# Patient Record
Sex: MALE | Race: WHITE | Employment: UNEMPLOYED | URBAN - METROPOLITAN AREA
[De-identification: names, ages, dates, MRNs, and addresses within clinical notes are randomized per-mention and may not be internally consistent; named-entity substitution may affect disease eponyms.]

---

## 2020-05-27 ENCOUNTER — TRANSFERRED RECORDS (OUTPATIENT)
Dept: HEALTH INFORMATION MANAGEMENT | Facility: CLINIC | Age: 30
End: 2020-05-27

## 2020-06-25 ENCOUNTER — MEDICAL CORRESPONDENCE (OUTPATIENT)
Dept: HEALTH INFORMATION MANAGEMENT | Facility: CLINIC | Age: 30
End: 2020-06-25

## 2020-07-07 ENCOUNTER — TRANSCRIBE ORDERS (OUTPATIENT)
Dept: OTHER | Age: 30
End: 2020-07-07

## 2020-07-07 DIAGNOSIS — R51.9 HEAD PAIN: Primary | ICD-10-CM

## 2020-08-20 ENCOUNTER — VIRTUAL VISIT (OUTPATIENT)
Dept: NEUROLOGY | Facility: CLINIC | Age: 30
End: 2020-08-20
Payer: COMMERCIAL

## 2020-08-20 DIAGNOSIS — R51.9 UNILATERAL HEADACHE: Primary | ICD-10-CM

## 2020-08-20 PROCEDURE — 99443 ZZC PHYSICIAN TELEPHONE EVALUATION 21-30 MIN: CPT | Performed by: PSYCHIATRY & NEUROLOGY

## 2020-08-20 RX ORDER — METOPROLOL TARTRATE 25 MG/1
25 TABLET, FILM COATED ORAL DAILY
COMMUNITY

## 2020-08-20 RX ORDER — SERTRALINE HYDROCHLORIDE 100 MG/1
100 TABLET, FILM COATED ORAL DAILY
COMMUNITY

## 2020-08-20 RX ORDER — DULOXETIN HYDROCHLORIDE 20 MG/1
20 CAPSULE, DELAYED RELEASE ORAL 2 TIMES DAILY
COMMUNITY

## 2020-08-20 RX ORDER — BUSPIRONE HYDROCHLORIDE 10 MG/1
20 TABLET ORAL 2 TIMES DAILY
COMMUNITY

## 2020-08-20 RX ORDER — NAPROXEN 500 MG/1
TABLET ORAL PRN
COMMUNITY

## 2020-08-20 RX ORDER — PRAZOSIN HYDROCHLORIDE 1 MG/1
1 CAPSULE ORAL AT BEDTIME
COMMUNITY

## 2020-08-20 RX ORDER — LISINOPRIL 10 MG/1
10 TABLET ORAL DAILY
COMMUNITY

## 2020-08-20 RX ORDER — MIRTAZAPINE 30 MG/1
30 TABLET, FILM COATED ORAL AT BEDTIME
COMMUNITY

## 2020-08-20 NOTE — PROGRESS NOTES
"Visit Date:   2020         Iris Bolaños NP   [facility]   [address] [place] [address]      Patient:  Ulysses Cooper   MRN:  [id number]   :  1990      This is a telephone consultation being done via telephone because of COVID-19.      Dear Ms. :      I did a telephone consultation for your patient, Ulysses Cooper, on 2020.  He is a 30-year-old male being evaluated for persistent right-sided headache.  He currently resides in the [facility].  Tico Quick RN is also on the call.      Mr. Cooper has had a constant right-sided head pain for the last 8 months.  He indicates it is around the right temple and to the center of his head.  The pain is worse when he is lying on his left side.  It is increased with activity and strain.  He may awaken in the middle of the night with the headache.      He also reports other symptoms.  He indicates he sees \"clear floating things\" in his vision.  This is bilateral and not affecting just a single visual field.  He also reports a change in his color perception.  For example, skin looks green.  He feels like he is disconnected from his body.  He reports dizziness and feels that his coordination is off, but he indicates his gait is fine.  He has no focal weakness.  He has a continuous noise in his head that is a nonpulsatile sound.      He does have a history of migraine in childhood that resolved when he was in his teens.  The headache he is experiencing now is quite different from his migraines.  The migraines involved his entire head.  He was photophobic and had nausea and vomiting with these.      He had been on gabapentin 300 mg 3 times a day for a while, but he does not think that that helped the headaches, although he did tolerate the drug well.  He indicates amitriptyline was of no help.  He is not certain if Topamax helped, but it made him feel \"weird.\"  Imitrex was of no benefit.  " He is now using naproxen, which has not helped.  He is also on duloxetine.      PAST MEDICAL HISTORY:  Notable for migraine in his childhood and teens, hypertension, anxiety and PTSD.      CURRENT MEDICATIONS:   1.  BuSpar 20 mg twice a day.   2.  Duloxetine 20 mg twice a day.   3.  Lisinopril.   4.  Metoprolol.   5.  Mirtazapine.   6.  Naproxen.   7.  Prazosin.   8.  Sertraline 200 mg.      ALLERGIES:  HE HAS NO MEDICAL ALLERGIES.      FAMILY HISTORY:  Negative for migraine.  There is a positive family history for head and neck cancer.  Apparently, these individuals were smokers.  There is no family history of brain tumor that he is aware of.      SOCIAL HISTORY:  He currently resides at the [facility].  He has not smoked for 2 years.  He does not use alcohol.      PHYSICAL EXAMINATION:  Could not be performed, because this is a telephone visit.      IMPRESSION:     1.  Persistent right-sided unilateral headache.   2.  History of migraine.      The current headache does not suggest migraine.  There are some disconcerting features to the headache.  It increases with strain for example, and it also awakens him from sleep.  I believe a structural intracranial lesion needs to be ruled out.      PLAN:  I have recommended a head MRI with and without contrast and discussed this with Mr. Cooper, as well as Tico Quick RN.      If the head MRI scan proves negative, then I think it would be worthwhile to retry gabapentin, perhaps at a higher dose than the 300 mg 3 times a day.  Specifically, I would start him on 300 mg 3 times a day and then over the next month or so gradually increase the dose as tolerated up to 600 mg 3 times a day or even 900 mg 3 times a day before concluding that it was ineffective.  Potential side effects were discussed, including the potential for depression.      I would appreciate receiving a copy of the MRI scan and the report once completed.      I would be happy to see Mr. Cooper  personally if necessary.      ADDENDUM:  Total telephone visit time was 21 minutes.     ADDENDUM 10/14/20: Head MRI done and is unremarkable. Sent letter to ANTONIO Garber MD             D: 2020   T: 2020   MT: ARNULFO      Name:     SHAWN HEARD   MRN:      [id number]        Account:      [id number]   :      1990           Visit Date:   2020      Document: [id number]       cc: [facility]

## 2020-08-20 NOTE — PROGRESS NOTES
"Ulysses Cooper is a 30 year old male who is being evaluated via a billable telephone visit.      The patient has been notified of following:     \"This telephone visit will be conducted via a call between you and your physician/provider. We have found that certain health care needs can be provided without the need for a physical exam.  This service lets us provide the care you need with a short phone conversation.  If a prescription is necessary we can send it directly to your pharmacy.  If lab work is needed we can place an order for that and you can then stop by our lab to have the test done at a later time.    Telephone visits are billed at different rates depending on your insurance coverage. During this emergency period, for some insurers they may be billed the same as an in-person visit.  Please reach out to your insurance provider with any questions.    If during the course of the call the physician/provider feels a telephone visit is not appropriate, you will not be charged for this service.\"    Patient has given verbal consent for Telephone visit? yes    What phone number would you like to be contacted at? [phone]        Phone call duration: 21 minutes          "

## 2020-08-20 NOTE — LETTER
"2020       RE: Ulysses Cooper  [facility] Office  [address]  [facility] [address]     Dear Colleague,    Thank you for referring your patient, Ulysses Cooper, to the [facility]. Please see a copy of my visit note below.    Ulysses Cooper is a 30 year old male who is being evaluated via a billable telephone visit.      The patient has been notified of following:     \"This telephone visit will be conducted via a call between you and your physician/provider. We have found that certain health care needs can be provided without the need for a physical exam.  This service lets us provide the care you need with a short phone conversation.  If a prescription is necessary we can send it directly to your pharmacy.  If lab work is needed we can place an order for that and you can then stop by our lab to have the test done at a later time.    Telephone visits are billed at different rates depending on your insurance coverage. During this emergency period, for some insurers they may be billed the same as an in-person visit.  Please reach out to your insurance provider with any questions.    If during the course of the call the physician/provider feels a telephone visit is not appropriate, you will not be charged for this service.\"    Patient has given verbal consent for Telephone visit? yes    What phone number would you like to be contacted at? [phone]        Phone call duration: 21 minutes            Visit Date:   2020         Iris Bolaños NP   [address] [address]      Patient:  Ulysses Cooper   MRN:  [id number]   :  1990      This is a telephone consultation being done via telephone because of COVID-19.      Dear Ms. Bolaños:      I did a telephone consultation for your patient, Ulysses Cooper, on 2020.  He is a 30-year-old male being evaluated for " "persistent right-sided headache.  He currently resides in the [facility].  Tico Quick RN is also on the call.      Mr. Cooper has had a constant right-sided head pain for the last 8 months.  He indicates it is around the right temple and to the center of his head.  The pain is worse when he is lying on his left side.  It is increased with activity and strain.  He may awaken in the middle of the night with the headache.      He also reports other symptoms.  He indicates he sees \"clear floating things\" in his vision.  This is bilateral and not affecting just a single visual field.  He also reports a change in his color perception.  For example, skin looks green.  He feels like he is disconnected from his body.  He reports dizziness and feels that his coordination is off, but he indicates his gait is fine.  He has no focal weakness.  He has a continuous noise in his head that is a nonpulsatile sound.      He does have a history of migraine in childhood that resolved when he was in his teens.  The headache he is experiencing now is quite different from his migraines.  The migraines involved his entire head.  He was photophobic and had nausea and vomiting with these.      He had been on gabapentin 300 mg 3 times a day for a while, but he does not think that that helped the headaches, although he did tolerate the drug well.  He indicates amitriptyline was of no help.  He is not certain if Topamax helped, but it made him feel \"weird.\"  Imitrex was of no benefit.  He is now using naproxen, which has not helped.  He is also on duloxetine.      PAST MEDICAL HISTORY:  Notable for migraine in his childhood and teens, hypertension, anxiety and PTSD.      CURRENT MEDICATIONS:   1.  BuSpar 20 mg twice a day.   2.  Duloxetine 20 mg twice a day.   3.  Lisinopril.   4.  Metoprolol.   5.  Mirtazapine.   6.  Naproxen.   7.  Prazosin.   8.  Sertraline 200 mg.      ALLERGIES:  HE HAS NO MEDICAL ALLERGIES.      FAMILY " HISTORY:  Negative for migraine.  There is a positive family history for head and neck cancer.  Apparently, these individuals were smokers.  There is no family history of brain tumor that he is aware of.      SOCIAL HISTORY:  He currently resides at the [facility].  He has not smoked for 2 years.  He does not use alcohol.      PHYSICAL EXAMINATION:  Could not be performed, because this is a telephone visit.      IMPRESSION:     1.  Persistent right-sided unilateral headache.   2.  History of migraine.      The current headache does not suggest migraine.  There are some disconcerting features to the headache.  It increases with strain for example, and it also awakens him from sleep.  I believe a structural intracranial lesion needs to be ruled out.      PLAN:  I have recommended a head MRI with and without contrast and discussed this with Mr. Cooper, as well as Tico Quick RN.      If the head MRI scan proves negative, then I think it would be worthwhile to retry gabapentin, perhaps at a higher dose than the 300 mg 3 times a day.  Specifically, I would start him on 300 mg 3 times a day and then over the next month or so gradually increase the dose as tolerated up to 600 mg 3 times a day or even 900 mg 3 times a day before concluding that it was ineffective.  Potential side effects were discussed, including the potential for depression.      I would appreciate receiving a copy of the MRI scan and the report once completed.      I would be happy to see Mr. Cooper personally if necessary.      ADDENDUM:  Total telephone visit time was 21 minutes.         GEOVANNA MOSLEY MD             D: 2020   T: 2020   MT: ARNULFO      Name:     SHAWN COOPER   MRN:      [id number]        Account:      [id number]   :      1990           Visit Date:   2020      Document: [id number]       cc: [facility]        Again, thank you for allowing me to participate in the care of your patient.       Sincerely,    Carlos De Luna MD

## 2020-08-20 NOTE — LETTER
"    2020         RE: Ulysses Cooper  [facility] Office  [address]  [facility] [address]        Dear Colleague,    Thank you for referring your patient, Ulysses Cooper, to the [facility]. Please see a copy of my visit note below.    Ulysses Cooper is a 30 year old male who is being evaluated via a billable telephone visit.      The patient has been notified of following:     \"This telephone visit will be conducted via a call between you and your physician/provider. We have found that certain health care needs can be provided without the need for a physical exam.  This service lets us provide the care you need with a short phone conversation.  If a prescription is necessary we can send it directly to your pharmacy.  If lab work is needed we can place an order for that and you can then stop by our lab to have the test done at a later time.    Telephone visits are billed at different rates depending on your insurance coverage. During this emergency period, for some insurers they may be billed the same as an in-person visit.  Please reach out to your insurance provider with any questions.    If during the course of the call the physician/provider feels a telephone visit is not appropriate, you will not be charged for this service.\"    Patient has given verbal consent for Telephone visit? yes    What phone number would you like to be contacted at? [phone]        Phone call duration: 21 minutes            Visit Date:   2020         Iris Bolaños NP   [address] [address]      Patient:  Ulysses Cooper   MRN:  [id number]   :  1990      This is a telephone consultation being done via telephone because of COVID-19.      Dear Ms. Bolaños:      I did a telephone consultation for your patient, Ulysses Cooper, on 2020.  He is a 30-year-old male being evaluated for persistent right-sided headache.  He currently " "resides in the [facility].  Tico Quick RN is also on the call.      Mr. Cooper has had a constant right-sided head pain for the last 8 months.  He indicates it is around the right temple and to the center of his head.  The pain is worse when he is lying on his left side.  It is increased with activity and strain.  He may awaken in the middle of the night with the headache.      He also reports other symptoms.  He indicates he sees \"clear floating things\" in his vision.  This is bilateral and not affecting just a single visual field.  He also reports a change in his color perception.  For example, skin looks green.  He feels like he is disconnected from his body.  He reports dizziness and feels that his coordination is off, but he indicates his gait is fine.  He has no focal weakness.  He has a continuous noise in his head that is a nonpulsatile sound.      He does have a history of migraine in childhood that resolved when he was in his teens.  The headache he is experiencing now is quite different from his migraines.  The migraines involved his entire head.  He was photophobic and had nausea and vomiting with these.      He had been on gabapentin 300 mg 3 times a day for a while, but he does not think that that helped the headaches, although he did tolerate the drug well.  He indicates amitriptyline was of no help.  He is not certain if Topamax helped, but it made him feel \"weird.\"  Imitrex was of no benefit.  He is now using naproxen, which has not helped.  He is also on duloxetine.      PAST MEDICAL HISTORY:  Notable for migraine in his childhood and teens, hypertension, anxiety and PTSD.      CURRENT MEDICATIONS:   1.  BuSpar 20 mg twice a day.   2.  Duloxetine 20 mg twice a day.   3.  Lisinopril.   4.  Metoprolol.   5.  Mirtazapine.   6.  Naproxen.   7.  Prazosin.   8.  Sertraline 200 mg.      ALLERGIES:  HE HAS NO MEDICAL ALLERGIES.      FAMILY HISTORY:  Negative for migraine.  There is a " positive family history for head and neck cancer.  Apparently, these individuals were smokers.  There is no family history of brain tumor that he is aware of.      SOCIAL HISTORY:  He currently resides at the [facility].  He has not smoked for 2 years.  He does not use alcohol.      PHYSICAL EXAMINATION:  Could not be performed, because this is a telephone visit.      IMPRESSION:     1.  Persistent right-sided unilateral headache.   2.  History of migraine.      The current headache does not suggest migraine.  There are some disconcerting features to the headache.  It increases with strain for example, and it also awakens him from sleep.  I believe a structural intracranial lesion needs to be ruled out.      PLAN:  I have recommended a head MRI with and without contrast and discussed this with Mr. Cooper, as well as Tico Quick RN.      If the head MRI scan proves negative, then I think it would be worthwhile to retry gabapentin, perhaps at a higher dose than the 300 mg 3 times a day.  Specifically, I would start him on 300 mg 3 times a day and then over the next month or so gradually increase the dose as tolerated up to 600 mg 3 times a day or even 900 mg 3 times a day before concluding that it was ineffective.  Potential side effects were discussed, including the potential for depression.      I would appreciate receiving a copy of the MRI scan and the report once completed.      I would be happy to see Mr. Cooper personally if necessary.      ADDENDUM:  Total telephone visit time was 21 minutes.         CARLOS MOSLEY MD             D: 2020   T: 2020   MT: ARNULFO      Name:     SHAWN COOPER   MRN:      [id number]        Account:      [id number]   :      1990           Visit Date:   2020      Document: [id number]       cc: [facility]        Again, thank you for allowing me to participate in the care of your patient.        Sincerely,        Carlos Mosley,  MD

## 2020-09-09 ENCOUNTER — APPOINTMENT (OUTPATIENT)
Dept: CT IMAGING | Facility: CLINIC | Age: 30
End: 2020-09-09
Attending: EMERGENCY MEDICINE
Payer: COMMERCIAL

## 2020-09-09 ENCOUNTER — HOSPITAL ENCOUNTER (EMERGENCY)
Facility: CLINIC | Age: 30
Discharge: HOME | End: 2020-09-09
Attending: EMERGENCY MEDICINE | Admitting: EMERGENCY MEDICINE
Payer: COMMERCIAL

## 2020-09-09 VITALS
TEMPERATURE: 99 F | RESPIRATION RATE: 18 BRPM | SYSTOLIC BLOOD PRESSURE: 128 MMHG | HEART RATE: 103 BPM | OXYGEN SATURATION: 95 % | DIASTOLIC BLOOD PRESSURE: 80 MMHG

## 2020-09-09 DIAGNOSIS — R55 SYNCOPE AND COLLAPSE: ICD-10-CM

## 2020-09-09 DIAGNOSIS — R51.9 RIGHT-SIDED HEADACHE: ICD-10-CM

## 2020-09-09 DIAGNOSIS — R53.1 LEFT-SIDED WEAKNESS: ICD-10-CM

## 2020-09-09 LAB
ALBUMIN SERPL-MCNC: 4.3 G/DL (ref 3.4–5)
ALP SERPL-CCNC: 61 U/L (ref 40–150)
ALT SERPL W P-5'-P-CCNC: 37 U/L (ref 0–70)
ANION GAP SERPL CALCULATED.3IONS-SCNC: 7 MMOL/L (ref 3–14)
AST SERPL W P-5'-P-CCNC: 25 U/L (ref 0–45)
BASOPHILS # BLD AUTO: 0.1 10E9/L (ref 0–0.2)
BASOPHILS NFR BLD AUTO: 0.8 %
BILIRUB SERPL-MCNC: 0.3 MG/DL (ref 0.2–1.3)
BUN SERPL-MCNC: 20 MG/DL (ref 7–30)
CALCIUM SERPL-MCNC: 9.2 MG/DL (ref 8.5–10.1)
CHLORIDE SERPL-SCNC: 105 MMOL/L (ref 94–109)
CO2 SERPL-SCNC: 27 MMOL/L (ref 20–32)
CREAT SERPL-MCNC: 1.1 MG/DL (ref 0.66–1.25)
DIFFERENTIAL METHOD BLD: NORMAL
EOSINOPHIL NFR BLD AUTO: 1.2 %
ERYTHROCYTE [DISTWIDTH] IN BLOOD BY AUTOMATED COUNT: 11.8 % (ref 10–15)
GFR SERPL CREATININE-BSD FRML MDRD: 89 ML/MIN/{1.73_M2}
GLUCOSE SERPL-MCNC: 95 MG/DL (ref 70–99)
HCT VFR BLD AUTO: 44.8 % (ref 40–53)
HGB BLD-MCNC: 15.7 G/DL (ref 13.3–17.7)
IMM GRANULOCYTES # BLD: 0 10E9/L (ref 0–0.4)
IMM GRANULOCYTES NFR BLD: 0.3 %
LYMPHOCYTES # BLD AUTO: 1.8 10E9/L (ref 0.8–5.3)
LYMPHOCYTES NFR BLD AUTO: 21 %
MCH RBC QN AUTO: 30.5 PG (ref 26.5–33)
MCHC RBC AUTO-ENTMCNC: 35 G/DL (ref 31.5–36.5)
MCV RBC AUTO: 87 FL (ref 78–100)
MONOCYTES # BLD AUTO: 0.6 10E9/L (ref 0–1.3)
MONOCYTES NFR BLD AUTO: 6.9 %
NEUTROPHILS # BLD AUTO: 6 10E9/L (ref 1.6–8.3)
NEUTROPHILS NFR BLD AUTO: 69.8 %
NRBC # BLD AUTO: 0 10*3/UL
NRBC BLD AUTO-RTO: 0 /100
PLATELET # BLD AUTO: 245 10E9/L (ref 150–450)
POTASSIUM SERPL-SCNC: 3.9 MMOL/L (ref 3.4–5.3)
PROT SERPL-MCNC: 7.4 G/DL (ref 6.8–8.8)
RBC # BLD AUTO: 5.15 10E12/L (ref 4.4–5.9)
SODIUM SERPL-SCNC: 139 MMOL/L (ref 133–144)
WBC # BLD AUTO: 8.6 10E9/L (ref 4–11)

## 2020-09-09 PROCEDURE — 25000125 ZZHC RX 250: Performed by: EMERGENCY MEDICINE

## 2020-09-09 PROCEDURE — 25000128 H RX IP 250 OP 636: Performed by: EMERGENCY MEDICINE

## 2020-09-09 PROCEDURE — 85025 COMPLETE CBC W/AUTO DIFF WBC: CPT | Performed by: EMERGENCY MEDICINE

## 2020-09-09 PROCEDURE — 25800030 ZZH RX IP 258 OP 636: Performed by: EMERGENCY MEDICINE

## 2020-09-09 PROCEDURE — 99284 EMERGENCY DEPT VISIT MOD MDM: CPT | Mod: Z6 | Performed by: EMERGENCY MEDICINE

## 2020-09-09 PROCEDURE — 96374 THER/PROPH/DIAG INJ IV PUSH: CPT | Performed by: EMERGENCY MEDICINE

## 2020-09-09 PROCEDURE — 80053 COMPREHEN METABOLIC PANEL: CPT | Performed by: EMERGENCY MEDICINE

## 2020-09-09 PROCEDURE — 96375 TX/PRO/DX INJ NEW DRUG ADDON: CPT | Performed by: EMERGENCY MEDICINE

## 2020-09-09 PROCEDURE — 96361 HYDRATE IV INFUSION ADD-ON: CPT | Performed by: EMERGENCY MEDICINE

## 2020-09-09 PROCEDURE — 70470 CT HEAD/BRAIN W/O & W/DYE: CPT

## 2020-09-09 PROCEDURE — 99285 EMERGENCY DEPT VISIT HI MDM: CPT | Mod: 25 | Performed by: EMERGENCY MEDICINE

## 2020-09-09 RX ORDER — METOCLOPRAMIDE HYDROCHLORIDE 5 MG/ML
10 INJECTION INTRAMUSCULAR; INTRAVENOUS ONCE
Status: COMPLETED | OUTPATIENT
Start: 2020-09-09 | End: 2020-09-09

## 2020-09-09 RX ORDER — KETOROLAC TROMETHAMINE 30 MG/ML
30 INJECTION, SOLUTION INTRAMUSCULAR; INTRAVENOUS ONCE
Status: COMPLETED | OUTPATIENT
Start: 2020-09-09 | End: 2020-09-09

## 2020-09-09 RX ORDER — DEXAMETHASONE SODIUM PHOSPHATE 10 MG/ML
10 INJECTION, SOLUTION INTRAMUSCULAR; INTRAVENOUS ONCE
Status: COMPLETED | OUTPATIENT
Start: 2020-09-09 | End: 2020-09-09

## 2020-09-09 RX ORDER — IOPAMIDOL 755 MG/ML
500 INJECTION, SOLUTION INTRAVASCULAR ONCE
Status: COMPLETED | OUTPATIENT
Start: 2020-09-09 | End: 2020-09-09

## 2020-09-09 RX ORDER — DIPHENHYDRAMINE HYDROCHLORIDE 50 MG/ML
25 INJECTION INTRAMUSCULAR; INTRAVENOUS ONCE
Status: COMPLETED | OUTPATIENT
Start: 2020-09-09 | End: 2020-09-09

## 2020-09-09 RX ADMIN — SODIUM CHLORIDE 1000 ML: 9 INJECTION, SOLUTION INTRAVENOUS at 21:28

## 2020-09-09 RX ADMIN — METOCLOPRAMIDE HYDROCHLORIDE 10 MG: 5 INJECTION INTRAMUSCULAR; INTRAVENOUS at 21:32

## 2020-09-09 RX ADMIN — KETOROLAC TROMETHAMINE 30 MG: 30 INJECTION, SOLUTION INTRAMUSCULAR at 21:31

## 2020-09-09 RX ADMIN — DEXAMETHASONE SODIUM PHOSPHATE 10 MG: 10 INJECTION INTRAMUSCULAR; INTRAVENOUS at 21:29

## 2020-09-09 RX ADMIN — IOPAMIDOL 60 ML: 755 INJECTION, SOLUTION INTRAVENOUS at 21:52

## 2020-09-09 RX ADMIN — SODIUM CHLORIDE 50 ML: 9 INJECTION, SOLUTION INTRAVENOUS at 21:52

## 2020-09-09 RX ADMIN — DIPHENHYDRAMINE HYDROCHLORIDE 25 MG: 50 INJECTION, SOLUTION INTRAMUSCULAR; INTRAVENOUS at 21:28

## 2020-09-09 NOTE — ED AVS SNAPSHOT
[facility] Emergency Department  [address] [place] [address]  Phone:  [phone]  Fax:  [phone]                                    Ulysses Cooper   MRN: [id number]    Department:  [facility] Emergency Department   Date of Visit:  9/9/2020           After Visit Summary Signature Page    I have received my discharge instructions, and my questions have been answered. I have discussed any challenges I see with this plan with the nurse or doctor.    ..........................................................................................................................................  Patient/Patient Representative Signature      ..........................................................................................................................................  Patient Representative Print Name and Relationship to Patient    ..................................................               ................................................  Date                                   Time    ..........................................................................................................................................  Reviewed by Signature/Title    ...................................................              ..............................................  Date                                               Time          22EPIC Rev 08/18

## 2020-09-10 NOTE — ED PROVIDER NOTES
"  History     Chief Complaint   Patient presents with     Fatigue     The history is provided by the patient.     Ulysses Cooper is a 30 year old male who presents to the emergency department with concerns of fatigue and neurological symptoms. The patient states that eight months ago he noticed a small pain in the right side of his head. Since then he says the pain has been getting progressively worse. He describes it as feeling like a knife is stuck in his skull. He says it feels like pressure, but denies throbbing. The pain is constantly present and \"never goes away\". It wakes him up from sleep, causes him to feel dizzy and causes ringing in his ears. He says the ringing in his ears sounds like a turbine in his head that is loud and notes that \"nothing blocks it out\".  He sees 'bubbles and floaties\", but denies blurry or double vision. He says it feels better to have his right eye closed from the pain, but says he is not sensitive to light. He sleeps about 12 hours and notes still being exhausted. He can be doing something for only 20 minutes then have to lay down because he feels weak. The patient has a copper taste in the right side of his mouth and says he has been tasting blood. He denies chest pain or shortness of breath. He gets nauseated from the pain, but denies any vomiting. Normal bowels and bladder.  Patient is currently incarcerated.  During roll call today, he was standing and suddenly had what sounds like a syncopal episode.  He remembers feeling weak in his legs giving out.  He felt sweaty and had numbness along the left side of his face, left leg, left arm and left hand following the syncopal episode today. This has since subsided. He was not oriented to the date when he came to, but is able to recall that correctly here in the ED. He states that he \"forgot how to swallow\" the other night when he was eating chips. He has noticed his problem solving skills have been lower than normal for him and his " "communication \"seems worse\".   He has not hit in the head and has not sustained any trauma to cause the worsening symptoms. The patient had a history of migraines when he was younger. He is otherwise healthy. He is being given Naxproxen, but says this has not been helping with any of his symptoms.     Review of medical records shows that patient has been evaluated by neurology and there was a recommendation for an MRI scan.  Not sure if this has actually been scheduled.  He was sent to the ED today because of the syncopal episode and the left-sided weakness.      Allergies:  No Known Allergies    Problem List:    There are no active problems to display for this patient.       Past Medical History:    No past medical history on file.    Past Surgical History:    No past surgical history on file.    Family History:    No family history on file.    Social History:  Marital Status:  Single [1]  Social History     Tobacco Use     Smoking status: Not on file   Substance Use Topics     Alcohol use: Not on file     Drug use: Not on file        Medications:    busPIRone (BUSPAR) 10 MG tablet  DULoxetine (CYMBALTA) 20 MG capsule  lisinopril (ZESTRIL) 10 MG tablet  metoprolol tartrate (LOPRESSOR) 25 MG tablet  mirtazapine (REMERON) 30 MG tablet  naproxen (NAPROSYN DR) 500 MG EC tablet  prazosin (MINIPRESS) 1 MG capsule  sertraline (ZOLOFT) 100 MG tablet          Review of Systems   All other systems reviewed and are negative.      Physical Exam   BP: (!) 142/96  Pulse: 103  Temp: 99  F (37.2  C)  Resp: 18  SpO2: 98 %      Physical Exam  Vitals signs and nursing note reviewed.   Constitutional:       Appearance: Normal appearance. He is normal weight.      Comments: Young, healthy-appearing male sitting on the bed.  He is in handcuffs.  He squints or closes his right eye while he is engaged in talking   HENT:      Head: Normocephalic.      Right Ear: Tympanic membrane normal.      Left Ear: Tympanic membrane normal.      Nose: " Nose normal.      Mouth/Throat:      Pharynx: Oropharynx is clear.      Comments: Tacky mucous membranes  Eyes:      General: No scleral icterus.     Extraocular Movements: Extraocular movements intact.      Conjunctiva/sclera: Conjunctivae normal.      Pupils: Pupils are equal, round, and reactive to light.   Neck:      Musculoskeletal: Normal range of motion and neck supple.   Cardiovascular:      Rate and Rhythm: Normal rate and regular rhythm.      Pulses: Normal pulses.      Heart sounds: Normal heart sounds.   Pulmonary:      Effort: Pulmonary effort is normal.      Breath sounds: Normal breath sounds.   Skin:     General: Skin is warm and dry.      Findings: No rash.   Neurological:      General: No focal deficit present.      Mental Status: He is alert and oriented to person, place, and time.      Comments: Patient seems to have slightly decreased  strength on the left and may have slightly decreased strength with plantarflexion of the foot on the left.  Gait not evaluated as patient was shackled.  However, he was able to ambulate and with the shackles.   Psychiatric:         Mood and Affect: Mood normal.         Behavior: Behavior normal.         ED Course (with Medical Decision Making)    Pt seen and examined by me.  RN and EPIC notes reviewed.       Patient with headache pain for 8 months.  Pain on the right side of the head with reported left-sided weakness and what sounds like a possible syncopal episode today.  He has been seen by neurology and it was recommended that he have an MRI scan.  Unfortunately, this is not available in this facility at this time.  I elected to do CT scan of his head with and without contrast to evaluate for any abnormalities.  Basic labs checked.  He was given fluids.  I tried some headache protocol medications including Toradol, Reglan/Benadryl, Decadron.    Labs are normal.  Patient had no relief of his headache pain with the above regimen.    CT/CTA is completely  normal as noted below.    Results discussed with the patient.  He can follow-up with neurology and possibly have the MRI scan in the future.  This was relayed to the [facility].  I am not going to add any medications at this point.  Return for worsening, changes or concerns.        Procedures      Results for orders placed or performed during the hospital encounter of 09/09/20 (from the past 24 hour(s))   CBC with platelets differential   Result Value Ref Range    WBC 8.6 4.0 - 11.0 10e9/L    RBC Count 5.15 4.4 - 5.9 10e12/L    Hemoglobin 15.7 13.3 - 17.7 g/dL    Hematocrit 44.8 40.0 - 53.0 %    MCV 87 78 - 100 fl    MCH 30.5 26.5 - 33.0 pg    MCHC 35.0 31.5 - 36.5 g/dL    RDW 11.8 10.0 - 15.0 %    Platelet Count 245 150 - 450 10e9/L    Diff Method Automated Method     % Neutrophils 69.8 %    % Lymphocytes 21.0 %    % Monocytes 6.9 %    % Eosinophils 1.2 %    % Basophils 0.8 %    % Immature Granulocytes 0.3 %    Nucleated RBCs 0 0 /100    Absolute Neutrophil 6.0 1.6 - 8.3 10e9/L    Absolute Lymphocytes 1.8 0.8 - 5.3 10e9/L    Absolute Monocytes 0.6 0.0 - 1.3 10e9/L    Absolute Basophils 0.1 0.0 - 0.2 10e9/L    Abs Immature Granulocytes 0.0 0 - 0.4 10e9/L    Absolute Nucleated RBC 0.0    Comprehensive metabolic panel   Result Value Ref Range    Sodium 139 133 - 144 mmol/L    Potassium 3.9 3.4 - 5.3 mmol/L    Chloride 105 94 - 109 mmol/L    Carbon Dioxide 27 20 - 32 mmol/L    Anion Gap 7 3 - 14 mmol/L    Glucose 95 70 - 99 mg/dL    Urea Nitrogen 20 7 - 30 mg/dL    Creatinine 1.10 0.66 - 1.25 mg/dL    GFR Estimate 89 >60 mL/min/[1.73_m2]    GFR Estimate If Black >90 >60 mL/min/[1.73_m2]    Calcium 9.2 8.5 - 10.1 mg/dL    Bilirubin Total 0.3 0.2 - 1.3 mg/dL    Albumin 4.3 3.4 - 5.0 g/dL    Protein Total 7.4 6.8 - 8.8 g/dL    Alkaline Phosphatase 61 40 - 150 U/L    ALT 37 0 - 70 U/L    AST 25 0 - 45 U/L   CT Head w/o & w Contrast    Narrative    EXAM: CT HEAD W/O and W CONTRAST  LOCATION: [facility]  DATE/TIME:  9/9/2020 10:06 PM    INDICATION: 8 months of right-sided headaches. new left-sided weakness  COMPARISON: None.  CONTRAST: 60mL, Isovue-370  TECHNIQUE: Routine head CT without and with IV contrast.  Dose reduction techniques were used.    FINDINGS:  INTRACRANIAL CONTENTS: No intracranial hemorrhage, extraaxial collection, or mass effect.  No CT evidence of acute infarct. Normal parenchymal attenuation. Normal ventricles and sulci. No pathologic enhancement identified.    VISUALIZED ORBITS/SINUSES/MASTOIDS: No intraorbital abnormality. No paranasal sinus mucosal disease. No middle ear or mastoid effusion.    BONES/SOFT TISSUES: No acute abnormality.      Impression    IMPRESSION:  1.  Normal head CT.       Medications   0.9% sodium chloride BOLUS (0 mLs Intravenous Stopped 9/9/20 2244)   dexamethasone PF (DECADRON) injection 10 mg (10 mg Intravenous Given 9/9/20 2129)   ketorolac (TORADOL) injection 30 mg (30 mg Intravenous Given 9/9/20 2131)   metoclopramide (REGLAN) injection 10 mg (10 mg Intravenous Given 9/9/20 2132)   diphenhydrAMINE (BENADRYL) injection 25 mg (25 mg Intravenous Given 9/9/20 2128)   Saline 100mL Bag (50 mLs Intravenous Given 9/9/20 2152)   iopamidol (ISOVUE-370) solution 500 mL (60 mLs Intravenous Given 9/9/20 2152)   sodium chloride (PF) 0.9% PF flush 3 mL (10 mLs Intravenous Given 9/9/20 2152)       Assessments & Plan     I have reviewed the findings, diagnosis, plan and need for follow up with the patient.    Discharge Medication List as of 9/9/2020 11:18 PM          Final diagnoses:   Right-sided headache   Left-sided weakness   Syncope and collapse     Disposition: Patient discharged in stable condition.  Plan as above.  Return for concerns.      This document serves as a record of services personally performed by Zoila Matias MD. It was created on their behalf by Jaqueline Link, a trained medical scribe. The creation of this record is based on the provider's personal observations and  the statements of the patient. This document has been checked and approved by the attending provider.  Note: Chart documentation done in part with Dragon Voice Recognition software. Although reviewed after completion, some word and grammatical errors may remain.  9/9/2020   [facility] EMERGENCY DEPARTMENT     Zoila Matias MD  09/10/20 0105

## 2020-09-10 NOTE — DISCHARGE INSTRUCTIONS
Labs and head CT with and without contrast were all completely normal.    Follow-up for MRI scan as scheduled/recommended.    Return for significant worsening, changes or concerns.   none

## 2020-10-01 ENCOUNTER — HOSPITAL ENCOUNTER (OUTPATIENT)
Dept: MRI IMAGING | Facility: CLINIC | Age: 30
Discharge: HOME | End: 2020-10-01
Attending: NURSE PRACTITIONER | Admitting: NURSE PRACTITIONER
Payer: COMMERCIAL

## 2020-10-01 DIAGNOSIS — R55 SYNCOPE: ICD-10-CM

## 2020-10-01 DIAGNOSIS — R51.9 RIGHT-SIDED HEADACHE: ICD-10-CM

## 2020-10-01 DIAGNOSIS — R53.1 LEFT-SIDED WEAKNESS: ICD-10-CM

## 2020-10-01 PROCEDURE — A9585 GADOBUTROL INJECTION: HCPCS

## 2020-10-01 PROCEDURE — 70553 MRI BRAIN STEM W/O & W/DYE: CPT

## 2020-10-01 PROCEDURE — 255N000002 HC RX 255 OP 636

## 2020-10-01 RX ORDER — GADOBUTROL 604.72 MG/ML
10 INJECTION INTRAVENOUS ONCE
Status: COMPLETED | OUTPATIENT
Start: 2020-10-01 | End: 2020-10-01

## 2020-10-01 RX ADMIN — GADOBUTROL 9 ML: 604.72 INJECTION INTRAVENOUS at 09:57
